# Patient Record
Sex: MALE | Race: WHITE | Employment: FULL TIME | URBAN - METROPOLITAN AREA
[De-identification: names, ages, dates, MRNs, and addresses within clinical notes are randomized per-mention and may not be internally consistent; named-entity substitution may affect disease eponyms.]

---

## 2018-05-06 ENCOUNTER — APPOINTMENT (OUTPATIENT)
Dept: CT IMAGING | Age: 28
End: 2018-05-06
Payer: COMMERCIAL

## 2018-05-06 ENCOUNTER — HOSPITAL ENCOUNTER (EMERGENCY)
Age: 28
Discharge: HOME | End: 2018-05-06
Attending: EMERGENCY MEDICINE
Payer: COMMERCIAL

## 2018-05-06 VITALS
WEIGHT: 165 LBS | BODY MASS INDEX: 23.62 KG/M2 | SYSTOLIC BLOOD PRESSURE: 120 MMHG | TEMPERATURE: 98.4 F | HEIGHT: 70 IN | RESPIRATION RATE: 16 BRPM | DIASTOLIC BLOOD PRESSURE: 68 MMHG | HEART RATE: 68 BPM | OXYGEN SATURATION: 99 %

## 2018-05-06 DIAGNOSIS — S01.511A LIP LACERATION, INITIAL ENCOUNTER: ICD-10-CM

## 2018-05-06 DIAGNOSIS — F10.920 ACUTE ALCOHOLIC INTOXICATION WITHOUT COMPLICATION (HCC): Primary | ICD-10-CM
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
## 2018-05-06 LAB
ALBUMIN SERPL-MCNC: 4.9 G/DL (ref 3.5–5.2)
ALP BLD-CCNC: 55 U/L (ref 40–129)
ALT SERPL-CCNC: 20 U/L (ref 0–40)
ANION GAP SERPL CALCULATED.3IONS-SCNC: 19 MMOL/L (ref 7–16)
AST SERPL-CCNC: 21 U/L (ref 0–39)
BILIRUB SERPL-MCNC: 0.5 MG/DL (ref 0–1.2)
BUN BLDV-MCNC: 11 MG/DL (ref 6–20)
CALCIUM SERPL-MCNC: 9.6 MG/DL (ref 8.6–10.2)
CHLORIDE BLD-SCNC: 104 MMOL/L (ref 98–107)
CO2: 19 MMOL/L (ref 22–29)
CREAT SERPL-MCNC: 0.9 MG/DL (ref 0.7–1.2)
ETHANOL: 243 MG/DL (ref 0–0.08)
GFR AFRICAN AMERICAN: >60
GFR NON-AFRICAN AMERICAN: >60 ML/MIN/1.73
GLUCOSE BLD-MCNC: 150 MG/DL (ref 74–109)
HCT VFR BLD CALC: 41.5 % (ref 37–54)
HEMOGLOBIN: 14.2 G/DL (ref 12.5–16.5)
MCH RBC QN AUTO: 28.4 PG (ref 26–35)
MCHC RBC AUTO-ENTMCNC: 34.2 % (ref 32–34.5)
MCV RBC AUTO: 83 FL (ref 80–99.9)
PDW BLD-RTO: 13.1 FL (ref 11.5–15)
PLATELET # BLD: 245 E9/L (ref 130–450)
PMV BLD AUTO: 9.4 FL (ref 7–12)
POTASSIUM SERPL-SCNC: 3.6 MMOL/L (ref 3.5–5)
RBC # BLD: 5 E12/L (ref 3.8–5.8)
SODIUM BLD-SCNC: 142 MMOL/L (ref 132–146)
TOTAL CK: 451 U/L (ref 20–200)
TOTAL PROTEIN: 7.4 G/DL (ref 6.4–8.3)
WBC # BLD: 10 E9/L (ref 4.5–11.5)

## 2018-05-06 PROCEDURE — 12013 RPR F/E/E/N/L/M 2.6-5.0 CM: CPT

## 2018-05-06 PROCEDURE — 6370000000 HC RX 637 (ALT 250 FOR IP): Performed by: FAMILY MEDICINE

## 2018-05-06 PROCEDURE — 85027 COMPLETE CBC AUTOMATED: CPT

## 2018-05-06 PROCEDURE — 36415 COLL VENOUS BLD VENIPUNCTURE: CPT

## 2018-05-06 PROCEDURE — 99284 EMERGENCY DEPT VISIT MOD MDM: CPT

## 2018-05-06 PROCEDURE — 80053 COMPREHEN METABOLIC PANEL: CPT

## 2018-05-06 PROCEDURE — 82550 ASSAY OF CK (CPK): CPT

## 2018-05-06 PROCEDURE — 80307 DRUG TEST PRSMV CHEM ANLYZR: CPT

## 2018-05-06 PROCEDURE — 70486 CT MAXILLOFACIAL W/O DYE: CPT

## 2018-05-06 PROCEDURE — 72125 CT NECK SPINE W/O DYE: CPT

## 2018-05-06 PROCEDURE — 70450 CT HEAD/BRAIN W/O DYE: CPT

## 2018-05-06 RX ORDER — DIAPER,BRIEF,INFANT-TODD,DISP
EACH MISCELLANEOUS ONCE
Status: COMPLETED | OUTPATIENT
Start: 2018-05-06 | End: 2018-05-06

## 2018-05-06 RX ADMIN — BACITRACIN ZINC: 500 OINTMENT TOPICAL at 07:05

## 2018-05-06 ASSESSMENT — PAIN DESCRIPTION - ONSET: ONSET: ON-GOING

## 2018-05-06 ASSESSMENT — PAIN SCALES - GENERAL: PAINLEVEL_OUTOF10: 7

## 2018-05-06 ASSESSMENT — PAIN DESCRIPTION - PAIN TYPE: TYPE: ACUTE PAIN

## 2018-05-06 ASSESSMENT — PAIN DESCRIPTION - LOCATION: LOCATION: FACE

## 2018-05-06 ASSESSMENT — PAIN DESCRIPTION - DESCRIPTORS: DESCRIPTORS: CONSTANT

## 2018-05-06 ASSESSMENT — PAIN DESCRIPTION - PROGRESSION: CLINICAL_PROGRESSION: NOT CHANGED

## 2021-07-27 ENCOUNTER — HOSPITAL ENCOUNTER (EMERGENCY)
Age: 31
Discharge: HOME | End: 2021-07-27
Attending: EMERGENCY MEDICINE
Payer: COMMERCIAL

## 2021-07-27 VITALS
WEIGHT: 165 LBS | TEMPERATURE: 96.7 F | RESPIRATION RATE: 16 BRPM | BODY MASS INDEX: 24.44 KG/M2 | HEART RATE: 70 BPM | HEIGHT: 69 IN | OXYGEN SATURATION: 100 % | DIASTOLIC BLOOD PRESSURE: 74 MMHG | SYSTOLIC BLOOD PRESSURE: 133 MMHG

## 2021-07-27 DIAGNOSIS — L03.115 CELLULITIS OF RIGHT LOWER EXTREMITY: Primary | ICD-10-CM

## 2021-07-27 PROCEDURE — 99284 EMERGENCY DEPT VISIT MOD MDM: CPT

## 2021-07-27 PROCEDURE — 6370000000 HC RX 637 (ALT 250 FOR IP): Performed by: EMERGENCY MEDICINE

## 2021-07-27 RX ORDER — CEPHALEXIN 500 MG/1
500 CAPSULE ORAL 4 TIMES DAILY
Qty: 28 CAPSULE | Refills: 0 | Status: SHIPPED | OUTPATIENT
Start: 2021-07-27 | End: 2021-08-03

## 2021-07-27 RX ORDER — CEPHALEXIN 500 MG/1
500 CAPSULE ORAL ONCE
Status: COMPLETED | OUTPATIENT
Start: 2021-07-27 | End: 2021-07-27

## 2021-07-27 RX ORDER — SULFAMETHOXAZOLE AND TRIMETHOPRIM 800; 160 MG/1; MG/1
1 TABLET ORAL 2 TIMES DAILY
Qty: 20 TABLET | Refills: 0 | Status: SHIPPED | OUTPATIENT
Start: 2021-07-27 | End: 2021-08-06

## 2021-07-27 RX ORDER — SULFAMETHOXAZOLE AND TRIMETHOPRIM 800; 160 MG/1; MG/1
1 TABLET ORAL ONCE
Status: COMPLETED | OUTPATIENT
Start: 2021-07-27 | End: 2021-07-27

## 2021-07-27 RX ORDER — PREDNISONE 10 MG/1
10 TABLET ORAL DAILY
COMMUNITY

## 2021-07-27 RX ORDER — AZITHROMYCIN 250 MG/1
250 TABLET, FILM COATED ORAL DAILY
COMMUNITY

## 2021-07-27 RX ADMIN — CEPHALEXIN 500 MG: 500 CAPSULE ORAL at 01:41

## 2021-07-27 RX ADMIN — SULFAMETHOXAZOLE AND TRIMETHOPRIM 1 TABLET: 800; 160 TABLET ORAL at 01:42

## 2021-07-27 ASSESSMENT — PAIN SCALES - GENERAL: PAINLEVEL_OUTOF10: 2

## 2021-07-27 ASSESSMENT — PAIN DESCRIPTION - DESCRIPTORS: DESCRIPTORS: ACHING

## 2021-07-27 ASSESSMENT — PAIN DESCRIPTION - ORIENTATION: ORIENTATION: RIGHT

## 2021-07-27 ASSESSMENT — PAIN DESCRIPTION - ONSET: ONSET: ON-GOING

## 2021-07-27 ASSESSMENT — PAIN DESCRIPTION - PROGRESSION: CLINICAL_PROGRESSION: NOT CHANGED

## 2021-07-27 ASSESSMENT — PAIN DESCRIPTION - LOCATION: LOCATION: KNEE

## 2021-07-27 ASSESSMENT — PAIN DESCRIPTION - PAIN TYPE: TYPE: ACUTE PAIN

## 2021-07-27 ASSESSMENT — PAIN DESCRIPTION - FREQUENCY: FREQUENCY: CONTINUOUS

## 2021-07-27 NOTE — ED PROVIDER NOTES
HPI:  7/27/21,   Time: 1:15 AM EDT       Lissette Knapp is a 31 y.o. male presenting to the ED for right leg redness/cellulitis, beginning 4 days ago. The complaint has been persistent, mild in severity, and worsened by nothing. Erythema medial aspect right knee, poss bug bite, thought abscess, tried to pop at home, nothing came out. No fever/chills/sweat/n/v    Review of Systems:   Pertinent positives and negatives are stated within HPI, all other systems reviewed and are negative.          --------------------------------------------- PAST HISTORY ---------------------------------------------  Past Medical History:  has no past medical history on file. Past Surgical History:  has a past surgical history that includes vascular surgery. Social History:  reports that he has been smoking cigarettes. He has been smoking about 1.00 pack per day. He has never used smokeless tobacco. He reports that he does not drink alcohol and does not use drugs. Family History: family history is not on file. The patients home medications have been reviewed. Allergies: Patient has no known allergies. ---------------------------------------------------PHYSICAL EXAM--------------------------------------    Constitutional/General: Alert and oriented x3, well appearing, non toxic in NAD  Head: Normocephalic and atraumatic  Eyes: PERRL, EOMI, conjunctive normal, sclera non icteric  Musculoskeletal: Moves all extremities x 4. Warm and well perfused, no clubbing, cyanosis, or edema. Capillary refill <3 seconds  Integument: skin warm and dry.  3 cm circular area erythema right medial knee/calf, no fluctuance felt  Lymphatic: no lymphadenopathy noted  Neurologic: GCS 15, no focal deficits, symmetric strength 5/5 in the upper and lower extremities bilaterally  Psychiatric: Normal Affect    -------------------------------------------------- RESULTS -------------------------------------------------  I have personally reviewed all laboratory and imaging results for this patient. Results are listed below. LABS:  No results found for this visit on 07/27/21. RADIOLOGY:  Interpreted by Radiologist.  No orders to display       EKG: This EKG is signed and interpreted by the EP. Time:   Rate:   Rhythm:   Interpretation:   Comparison:       ------------------------- NURSING NOTES AND VITALS REVIEWED ---------------------------   The nursing notes within the ED encounter and vital signs as below have been reviewed by myself. /74   Pulse 70   Temp 96.7 °F (35.9 °C) (Temporal)   Resp 16   Ht 5' 9\" (1.753 m)   Wt 165 lb (74.8 kg)   SpO2 100%   BMI 24.37 kg/m²   Oxygen Saturation Interpretation: Normal    The patients available past medical records and past encounters were reviewed. ------------------------------ ED COURSE/MEDICAL DECISION MAKING----------------------  Medications   cephALEXin (KEFLEX) capsule 500 mg (has no administration in time range)   sulfamethoxazole-trimethoprim (BACTRIM DS;SEPTRA DS) 800-160 MG per tablet 1 tablet (has no administration in time range)         ED COURSE:       Medical Decision Making:    Non toxic, no drainable abscess, dc on po abx      This patient's ED course included: a personal history and physicial examination    This patient has remained hemodynamically stable during their ED course. Counseling: The emergency provider has spoken with the patient and discussed todays results, in addition to providing specific details for the plan of care and counseling regarding the diagnosis and prognosis. Questions are answered at this time and they are agreeable with the plan.       --------------------------------- IMPRESSION AND DISPOSITION ---------------------------------    IMPRESSION  1.  Cellulitis of right lower extremity        DISPOSITION  Disposition: Discharge to home  Patient condition is stable    NOTE: This report was transcribed using voice recognition software.  Every effort was made to ensure accuracy; however, inadvertent computerized transcription errors may be present        Virginia Briseno MD  07/27/21 [id number]

## 2023-03-15 ENCOUNTER — OFFICE VISIT (OUTPATIENT)
Dept: PRIMARY CARE CLINIC | Age: 33
End: 2023-03-15
Payer: COMMERCIAL

## 2023-03-15 VITALS — SYSTOLIC BLOOD PRESSURE: 143 MMHG | HEART RATE: 80 BPM | DIASTOLIC BLOOD PRESSURE: 91 MMHG | TEMPERATURE: 97.9 F

## 2023-03-15 DIAGNOSIS — J01.90 ACUTE BACTERIAL SINUSITIS: ICD-10-CM

## 2023-03-15 DIAGNOSIS — B96.89 ACUTE BACTERIAL SINUSITIS: ICD-10-CM

## 2023-03-15 DIAGNOSIS — R05.8 PRODUCTIVE COUGH: ICD-10-CM

## 2023-03-15 PROCEDURE — 99213 OFFICE O/P EST LOW 20 MIN: CPT | Performed by: NURSE PRACTITIONER

## 2023-03-15 RX ORDER — AZITHROMYCIN 250 MG/1
250 TABLET, FILM COATED ORAL SEE ADMIN INSTRUCTIONS
Qty: 6 TABLET | Refills: 0 | Status: SHIPPED | OUTPATIENT
Start: 2023-03-15 | End: 2023-03-20

## 2023-03-15 RX ORDER — BROMPHENIRAMINE MALEATE, PSEUDOEPHEDRINE HYDROCHLORIDE, AND DEXTROMETHORPHAN HYDROBROMIDE 2; 30; 10 MG/5ML; MG/5ML; MG/5ML
5 SYRUP ORAL 4 TIMES DAILY PRN
Qty: 118 ML | Refills: 0 | Status: SHIPPED | OUTPATIENT
Start: 2023-03-15

## 2023-03-15 NOTE — PROGRESS NOTES
Chief Complaint:   Sinusitis (X1 week ) and Headache (X1 week )      History of Present Illness   Source of history provided by:  patientRoula Hall is a 29 y.o. old male with a past medical history of: No past medical history on file. Pt presents to the [facility] care with sinus pressure/headaches/congestion/productive cough w/yellow mucus for the past 7 days. No  fever noted. Denies any N/V/D, abdominal pain, CP, progressive SOB, dizziness, or lethargy. Pt has been taking OTC decongestants w/o improvement         ROS    Unless otherwise stated in this report or unable to obtain because of the patient's clinical or mental status as evidenced by the medical record, this patients's positive and negative responses for Review of Systems, constitutional, psych, eyes, ENT, cardiovascular, respiratory, gastrointestinal, neurological, genitourinary, musculoskeletal, integument systems and systems related to the presenting problem are either stated in the preceding or were not pertinent or were negative for the symptoms and/or complaints related to the medical problem. Past Surgical History:  has a past surgical history that includes vascular surgery. Social History:  reports that he has been smoking cigarettes. He has been smoking an average of 1 pack per day. He has never used smokeless tobacco. He reports that he does not drink alcohol and does not use drugs. Family History: family history is not on file. Allergies: Patient has no known allergies. Physical Exam         VS:  BP (!) 143/91   Pulse 80   Temp 97.9 °F (36.6 °C) (Temporal)    Oxygen Saturation Interpretation: Normal.    Constitutional:  Alert, development consistent with age. Ears:  External Ears: Normal bilateral pinna. TM's & External Canals: TM's normal bilaterally without perforation. Canals without erythema or drainage. Nose:   There is no obvious septal defect.  Diffuse redness/edema, frontal sinus tenderness present  Mouth:  Moist bucca mucosa and normal tongue. Throat: Mild posterior pharyngeal erythema without exudates or lesions. Neck:  Supple. There is no obvious adenopathy or neck tenderness. Lungs:   Breath sounds: Normal chest expansion and breath sounds noted throughout. No wheezes, rales, or rhonchi noted. Heart:  Regular rate and rhythm, normal heart sounds, without pathological murmurs, ectopy, gallops, or rubs. Skin:  Normal turgor. Warm, dry, without visible rash. Neurological:  Oriented. Motor functions intact. Lab / Imaging Results   (All laboratory and radiology results have been personally reviewed by myself)  Labs:  No results found for this visit on 03/15/23. Imaging: All Radiology results interpreted by Radiologist unless otherwise noted. No orders to display         Assessment / Plan     Impression(s):  1. Acute bacterial sinusitis    2. Productive cough      Disposition:  Disposition: Advised to start Zithromax and Bromfed.  Stay well hydrated, return to walk in care as needed

## 2023-04-18 ENCOUNTER — OFFICE VISIT (OUTPATIENT)
Dept: PRIMARY CARE CLINIC | Age: 33
End: 2023-04-18
Payer: COMMERCIAL

## 2023-04-18 VITALS — DIASTOLIC BLOOD PRESSURE: 85 MMHG | TEMPERATURE: 73 F | SYSTOLIC BLOOD PRESSURE: 129 MMHG | HEART RATE: 73 BPM

## 2023-04-18 DIAGNOSIS — L23.7 POISON IVY: Primary | ICD-10-CM

## 2023-04-18 PROCEDURE — 96372 THER/PROPH/DIAG INJ SC/IM: CPT | Performed by: NURSE PRACTITIONER

## 2023-04-18 PROCEDURE — 99213 OFFICE O/P EST LOW 20 MIN: CPT | Performed by: NURSE PRACTITIONER

## 2023-04-18 RX ORDER — FAMOTIDINE 20 MG/1
20 TABLET, FILM COATED ORAL 2 TIMES DAILY
Qty: 28 TABLET | Refills: 0 | Status: SHIPPED | OUTPATIENT
Start: 2023-04-18 | End: 2023-05-02

## 2023-04-18 RX ORDER — PREDNISONE 20 MG/1
20 TABLET ORAL 2 TIMES DAILY
Qty: 10 TABLET | Refills: 0 | Status: SHIPPED | OUTPATIENT
Start: 2023-04-18 | End: 2023-04-23

## 2023-04-18 RX ORDER — DEXAMETHASONE SODIUM PHOSPHATE 10 MG/ML
10 INJECTION INTRAMUSCULAR; INTRAVENOUS ONCE
Status: COMPLETED | OUTPATIENT
Start: 2023-04-18 | End: 2023-04-18

## 2023-04-18 RX ADMIN — DEXAMETHASONE SODIUM PHOSPHATE 10 MG: 10 INJECTION INTRAMUSCULAR; INTRAVENOUS at 10:30

## 2023-04-18 NOTE — PROGRESS NOTES
Chief Complaint:   Poison Ivy      History of Present Illness   Source of history provided by:  patient. Saima Ramirez is a 29 y.o. old male who has a past medical history of: No past medical history on file. Pt presents to the [facility] care for complaint of redness to the Torso/Buttocks/Groin/RLE, which began 1-2 days ago. The symptoms were triggered by exposure to poison ivy. Since onset the symptoms have been worsening. Pt states the area is itchy, burns slightly, and has no noted streaking. Pt exposed to poison ivy. Denies any fever, chills, HA, recent illness, myalgias, vomiting, or lethargy. ROS    Unless otherwise stated in this report or unable to obtain because of the patient's clinical or mental status as evidenced by the medical record, this patients's positive and negative responses for Review of Systems, constitutional, psych, eyes, ENT, cardiovascular, respiratory, gastrointestinal, neurological, genitourinary, musculoskeletal, integument systems and systems related to the presenting problem are either stated in the preceding or were not pertinent or were negative for the symptoms and/or complaints related to the medical problem. Past Surgical History:  has a past surgical history that includes vascular surgery. Social History:  reports that he has been smoking cigarettes. He has been smoking an average of 1 pack per day. He has never used smokeless tobacco. He reports that he does not drink alcohol and does not use drugs. Family History: family history is not on file. Allergies: Patient has no known allergies. Physical Exam         VS:  /85   Pulse 73   Temp (!) 73 °F (22.8 °C)    Oxygen Saturation Interpretation: Normal.    Constitutional:  Alert, development consistent with age. HEENT:  NC/NT. Airway patent. Eyes:  PERRL, EOMI, no discharge. Ears:  TMs without perforation, injection, or bulging. External canals clear without exudate.   Mouth:  Mucous

## 2024-02-29 ENCOUNTER — OFFICE VISIT (OUTPATIENT)
Dept: PRIMARY CARE CLINIC | Age: 34
End: 2024-02-29
Payer: COMMERCIAL

## 2024-02-29 VITALS
BODY MASS INDEX: 28.29 KG/M2 | SYSTOLIC BLOOD PRESSURE: 119 MMHG | TEMPERATURE: 97.7 F | HEART RATE: 91 BPM | HEIGHT: 69 IN | DIASTOLIC BLOOD PRESSURE: 83 MMHG | OXYGEN SATURATION: 95 % | WEIGHT: 191 LBS

## 2024-02-29 DIAGNOSIS — R11.0 NAUSEA IN ADULT PATIENT: ICD-10-CM

## 2024-02-29 DIAGNOSIS — J10.1 INFLUENZA A: Primary | ICD-10-CM

## 2024-02-29 LAB
INFLUENZA A ANTIGEN, POC: POSITIVE
INFLUENZA B ANTIGEN, POC: NEGATIVE
LOT EXPIRE DATE: ABNORMAL
LOT KIT NUMBER: ABNORMAL
SARS-COV-2, POC: ABNORMAL
VALID INTERNAL CONTROL: ABNORMAL
VENDOR AND KIT NAME POC: ABNORMAL

## 2024-02-29 PROCEDURE — 99213 OFFICE O/P EST LOW 20 MIN: CPT | Performed by: NURSE PRACTITIONER

## 2024-02-29 PROCEDURE — 87428 SARSCOV & INF VIR A&B AG IA: CPT | Performed by: NURSE PRACTITIONER

## 2024-02-29 RX ORDER — PREDNISONE 10 MG/1
10 TABLET ORAL 2 TIMES DAILY
Qty: 10 TABLET | Refills: 0 | Status: SHIPPED | OUTPATIENT
Start: 2024-02-29 | End: 2024-03-05

## 2024-02-29 RX ORDER — OSELTAMIVIR PHOSPHATE 75 MG/1
75 CAPSULE ORAL 2 TIMES DAILY
Qty: 10 CAPSULE | Refills: 0 | Status: SHIPPED | OUTPATIENT
Start: 2024-02-29 | End: 2024-03-05

## 2024-02-29 RX ORDER — ONDANSETRON 4 MG/1
4 TABLET, ORALLY DISINTEGRATING ORAL 3 TIMES DAILY PRN
Qty: 12 TABLET | Refills: 0 | Status: SHIPPED | OUTPATIENT
Start: 2024-02-29

## 2024-02-29 NOTE — PROGRESS NOTES
Chief Complaint:   Fever, Congestion (Sinus congestion ), Fatigue, and Headache      History of Present Illness   Source of history provided by:  patient.      Chucky Reilly is a 33 y.o. old male with a past medical history of: No past medical history on file.  Pt presents to the Walk In Care with a cough/fevers/headaches/congestion/fatigue/nausea/body aches for the past few days.  States the cough is non productive.   Subjective fever noted.  Denies any V/D, abdominal pain, CP, progressive SOB, dizziness, or any other concerning issues.  Pt is tolerating liquids but decreased for solids        ROS    Unless otherwise stated in this report or unable to obtain because of the patient's clinical or mental status as evidenced by the medical record, this patients's positive and negative responses for Review of Systems, constitutional, psych, eyes, ENT, cardiovascular, respiratory, gastrointestinal, neurological, genitourinary, musculoskeletal, integument systems and systems related to the presenting problem are either stated in the preceding or were not pertinent or were negative for the symptoms and/or complaints related to the medical problem.    Past Surgical History:  has a past surgical history that includes vascular surgery.  Social History:  reports that he has been smoking cigarettes. He has never used smokeless tobacco. He reports that he does not drink alcohol and does not use drugs.  Family History: family history is not on file.   Allergies: Patient has no known allergies.    Physical Exam         VS:  /83   Pulse 91   Temp 97.7 °F (36.5 °C)   Ht 1.753 m (5' 9\")   Wt 86.6 kg (191 lb)   SpO2 95%   BMI 28.21 kg/m²    Oxygen Saturation Interpretation: Normal.    Constitutional:  Alert, development consistent with age.  Ears:  External Ears: Normal bilateral pinna.             TM's & External Canals: TM's normal bilaterally without perforation.  Canals without erythema or drainage.    Nose:   There is

## 2024-09-06 ENCOUNTER — HOSPITAL ENCOUNTER (EMERGENCY)
Age: 34
Discharge: HOME | End: 2024-09-06
Payer: COMMERCIAL

## 2024-09-06 VITALS
RESPIRATION RATE: 16 BRPM | WEIGHT: 185 LBS | OXYGEN SATURATION: 98 % | SYSTOLIC BLOOD PRESSURE: 144 MMHG | HEART RATE: 116 BPM | TEMPERATURE: 97.9 F | BODY MASS INDEX: 27.4 KG/M2 | HEIGHT: 69 IN | DIASTOLIC BLOOD PRESSURE: 102 MMHG

## 2024-09-06 DIAGNOSIS — T63.444A BEE STING, UNDETERMINED INTENT, INITIAL ENCOUNTER: Primary | ICD-10-CM

## 2024-09-06 DIAGNOSIS — T63.441A ALLERGIC REACTION TO BEE STING: ICD-10-CM

## 2024-09-06 PROCEDURE — 96374 THER/PROPH/DIAG INJ IV PUSH: CPT

## 2024-09-06 PROCEDURE — 96375 TX/PRO/DX INJ NEW DRUG ADDON: CPT

## 2024-09-06 PROCEDURE — 99284 EMERGENCY DEPT VISIT MOD MDM: CPT

## 2024-09-06 PROCEDURE — 2580000003 HC RX 258: Performed by: PHYSICIAN ASSISTANT

## 2024-09-06 PROCEDURE — 6360000002 HC RX W HCPCS: Performed by: PHYSICIAN ASSISTANT

## 2024-09-06 PROCEDURE — 2500000003 HC RX 250 WO HCPCS: Performed by: PHYSICIAN ASSISTANT

## 2024-09-06 RX ORDER — DIPHENHYDRAMINE HYDROCHLORIDE 50 MG/ML
50 INJECTION INTRAMUSCULAR; INTRAVENOUS ONCE
Status: COMPLETED | OUTPATIENT
Start: 2024-09-06 | End: 2024-09-06

## 2024-09-06 RX ORDER — PREDNISONE 10 MG/1
TABLET ORAL
Qty: 20 TABLET | Refills: 0 | Status: SHIPPED | OUTPATIENT
Start: 2024-09-06 | End: 2024-09-16

## 2024-09-06 RX ORDER — METHYLPREDNISOLONE SODIUM SUCCINATE 125 MG/2ML
125 INJECTION, POWDER, LYOPHILIZED, FOR SOLUTION INTRAMUSCULAR; INTRAVENOUS ONCE
Status: COMPLETED | OUTPATIENT
Start: 2024-09-06 | End: 2024-09-06

## 2024-09-06 RX ORDER — 0.9 % SODIUM CHLORIDE 0.9 %
1000 INTRAVENOUS SOLUTION INTRAVENOUS ONCE
Status: COMPLETED | OUTPATIENT
Start: 2024-09-06 | End: 2024-09-06

## 2024-09-06 RX ADMIN — SODIUM CHLORIDE 1000 ML: 9 INJECTION, SOLUTION INTRAVENOUS at 12:08

## 2024-09-06 RX ADMIN — METHYLPREDNISOLONE SODIUM SUCCINATE 125 MG: 125 INJECTION INTRAMUSCULAR; INTRAVENOUS at 12:07

## 2024-09-06 RX ADMIN — FAMOTIDINE 20 MG: 10 INJECTION, SOLUTION INTRAVENOUS at 12:07

## 2024-09-06 RX ADMIN — DIPHENHYDRAMINE HYDROCHLORIDE 50 MG: 50 INJECTION INTRAMUSCULAR; INTRAVENOUS at 12:08

## 2024-09-06 ASSESSMENT — PAIN DESCRIPTION - LOCATION: LOCATION: LEG

## 2024-09-06 ASSESSMENT — PAIN DESCRIPTION - ONSET: ONSET: ON-GOING

## 2024-09-06 ASSESSMENT — PAIN DESCRIPTION - DESCRIPTORS: DESCRIPTORS: DISCOMFORT

## 2024-09-06 ASSESSMENT — PAIN SCALES - GENERAL: PAINLEVEL_OUTOF10: 3

## 2024-09-06 ASSESSMENT — PAIN - FUNCTIONAL ASSESSMENT: PAIN_FUNCTIONAL_ASSESSMENT: 0-10

## 2024-09-06 ASSESSMENT — PAIN DESCRIPTION - ORIENTATION: ORIENTATION: LEFT

## 2024-09-06 ASSESSMENT — PAIN DESCRIPTION - FREQUENCY: FREQUENCY: CONTINUOUS

## 2024-09-06 ASSESSMENT — PAIN DESCRIPTION - PAIN TYPE: TYPE: ACUTE PAIN

## 2024-09-06 NOTE — ED PROVIDER NOTES
Independent [place] Visit.             [facility] EMERGENCY DEPARTMENT  ED  Encounter Note  Admit Date/RoomTime: 2024 11:52 AM  ED Room: 37/37  NAME: Chucky Reilly  : 1990  MRN: [id number]  PCP: No primary care provider on file.    CHIEF COMPLAINT     Insect Bite (Stung by bee 20mins ago. History of bad reaction. 99% SpO2 at pivot )    HISTORY OF PRESENT ILLNESS        Chucky Reilly is a 33 y.o. male who presents to the ED by private vehicle for bee sting, concern for allergic reaction, beginning 30 min ago. The complaint has been persistent and are mild in severity.  The patient states he was stung by a single bee on the anterior left lower leg about 30 minutes ago.  He states that he was driving in a vehicle and started feeling \"strange\" he decided to come here to be seen.  States that he is obviously anxious but has a sense that maybe his throat is swelling.  He has not noted any swelling of the lips or tongue.  States that he did have a bad reaction about 10 years ago.  Was given medications at that time because of hives.  The patient has not had any shortness of breath or wheezing.  He has not noted any rash or lesions.  Did not take any medications prior to arrival.        REVIEW OF SYSTEMS     Pertinent positives and negatives are stated within HPI, all other systems reviewed and are negative.    Past Medical History:  has no past medical history on file.  Surgical History:  has a past surgical history that includes vascular surgery.  Social History:  reports that he has been smoking cigarettes. He has never used smokeless tobacco. He reports that he does not drink alcohol and does not use drugs.  Family History: family history is not on file.   Allergies: Bee venom  CURRENT MEDICATIONS       Previous Medications    BROMPHENIRAMINE-PSEUDOEPHEDRINE-DM 2-30-10 MG/5ML SYRUP    Take 5 mLs by mouth 4 times daily as needed for Congestion or Cough    FAMOTIDINE (PEPCID) 20 MG

## 2025-06-25 ENCOUNTER — OFFICE VISIT (OUTPATIENT)
Dept: FAMILY MEDICINE CLINIC | Age: 35
End: 2025-06-25
Payer: COMMERCIAL

## 2025-06-25 ENCOUNTER — RESULTS FOLLOW-UP (OUTPATIENT)
Dept: FAMILY MEDICINE CLINIC | Age: 35
End: 2025-06-25

## 2025-06-25 VITALS
HEART RATE: 108 BPM | HEIGHT: 69 IN | BODY MASS INDEX: 29.8 KG/M2 | SYSTOLIC BLOOD PRESSURE: 138 MMHG | DIASTOLIC BLOOD PRESSURE: 76 MMHG | TEMPERATURE: 98.4 F | WEIGHT: 201.2 LBS | OXYGEN SATURATION: 96 %

## 2025-06-25 DIAGNOSIS — M79.671 RIGHT FOOT PAIN: Primary | ICD-10-CM

## 2025-06-25 DIAGNOSIS — Z23 NEED FOR TETANUS BOOSTER: ICD-10-CM

## 2025-06-25 DIAGNOSIS — S91.331A PUNCTURE WOUND OF RIGHT FOOT, INITIAL ENCOUNTER: ICD-10-CM

## 2025-06-25 PROCEDURE — 90471 IMMUNIZATION ADMIN: CPT | Performed by: PHYSICIAN ASSISTANT

## 2025-06-25 PROCEDURE — 99204 OFFICE O/P NEW MOD 45 MIN: CPT | Performed by: PHYSICIAN ASSISTANT

## 2025-06-25 PROCEDURE — 90472 IMMUNIZATION ADMIN EACH ADD: CPT | Performed by: PHYSICIAN ASSISTANT

## 2025-06-25 PROCEDURE — 90715 TDAP VACCINE 7 YRS/> IM: CPT | Performed by: PHYSICIAN ASSISTANT

## 2025-06-25 RX ORDER — MUPIROCIN 2 %
OINTMENT (GRAM) TOPICAL
Qty: 30 G | Refills: 0 | Status: SHIPPED | OUTPATIENT
Start: 2025-06-25

## 2025-06-25 RX ORDER — LEVOFLOXACIN 750 MG/1
750 TABLET, FILM COATED ORAL DAILY
Qty: 10 TABLET | Refills: 0 | Status: SHIPPED | OUTPATIENT
Start: 2025-06-25 | End: 2025-07-05

## 2025-06-25 NOTE — PROGRESS NOTES
25  Chucky Reilly : 1990 Sex: male  Age 34 y.o.      Subjective:  Chief Complaint   Patient presents with    Puncture Wound     Right foot, stepped on nail yesterday         HPI:     History of Present Illness  The patient is a 34-year-old male who presents for evaluation of a puncture wound on his right foot.    He reports an incident where he stepped on a nail yesterday, which penetrated through his shoe and into his right foot. He describes the sensation as akin to having a large blister on the sole of his foot when he walks. He is uncertain about the status of his tetanus vaccination. This injury is not related to his occupation.          ROS:   Unless otherwise stated in this report the patient's positive and negative responses for review of systems for constitutional, eyes, ENT, cardiovascular, respiratory, gastrointestinal, neurological, , musculoskeletal, and integument systems and related systems to the presenting problem are either stated in the history of present illness or were not pertinent or were negative for the symptoms and/or complaints related to the presenting medical problem.  Positives and pertinent negatives as per HPI.  All others reviewed and are negative.      PMH:   History reviewed. No pertinent past medical history.    Past Surgical History:   Procedure Laterality Date    VASCULAR SURGERY         History reviewed. No pertinent family history.    Medications:     Current Outpatient Medications   Medication Sig Dispense Refill    levoFLOXacin (LEVAQUIN) 750 MG tablet Take 1 tablet by mouth daily for 10 days 10 tablet 0    mupirocin (BACTROBAN) 2 % ointment Apply topically 3 times daily. 30 g 0     No current facility-administered medications for this visit.        Allergies:     Allergies   Allergen Reactions    Bee Venom Hives       Social History:     Social History     Tobacco Use    Smoking status: Every Day     Current packs/day: 1.00     Types: Cigarettes